# Patient Record
(demographics unavailable — no encounter records)

---

## 2024-11-06 NOTE — HISTORY OF PRESENT ILLNESS
[de-identified] : rash [FreeTextEntry6] : Just moved here from [place] 1 month ago. Generally healthy.    He needs to get vaccines. He needs flu an varicella.   He has a rash on his arm and on his stomach.  He has a rash on his arms   Photos bumpy on abdomen skin.    no rash on legs.   Tiny patch on right arm from [place].  Bathing every evening. Aveeno natural baby soap.  Use aveeno cream.

## 2024-11-12 NOTE — HISTORY OF PRESENT ILLNESS
[Mother] : mother [Normal] : Normal [Smoke Detectors] : Smoke detectors [Carbon Monoxide Detectors] : Carbon monoxide detectors [de-identified] : he has a cup of milk per day and weaning off of formula, good eater most of the time.  loves breakfast. [FreeTextEntry3] : thru the night more or less. just weaned off breast at 1st birthday, transitioning to one nap. [de-identified] : they are in temporary housing until Dec 1 [de-identified] : [place] [FreeTextEntry1] : The atopic dermatitis is much improved with the steroid cream.  nighttime formula in bottle  they brush his teeth  He eats Wheatabix daily and used to drink JUMANA formula, now he has one JUMANA  bottle per night. Mom had anemia during pregnancy but no longer.

## 2024-11-12 NOTE — PLAN
[TextEntry] :  5 ml of Paracetamol every 4 hours for fever > 100.4 or crankiness for the next 72 hours and again in 7-10 days.  Transition to whole cow's milk. Continue table foods, 3 meals with 2-3 snacks per day. Incorporate up to 6 oz of fluorinated water daily in a sippy cup. Brush teeth twice a day with soft toothbrush. Recommend visit to dentist. When in car, keep child in rear-facing car seats until age 2, or until  the maximum height and weight for seat is reached. Put baby to sleep in own crib with no loose or soft bedding. Lower crib matress. Help baby to maintain consistent daily routines and sleep schedule. Recognize stranger and separation anxiety. Ensure home is safe since baby is increasingly mobile. Be within arm's reach of baby at all times. Use consistent, positive discipline. Avoid screen time. Read aloud to baby.  lead and cbc sent to lab.,  lead was 4.4 and Hgb 9.9

## 2024-11-12 NOTE — PHYSICAL EXAM
[Alert] : alert [Normocephalic] : normocephalic [Red Reflex] : red reflex bilateral [PERRL] : PERRL [Normally Placed Ears] : normally placed ears [Auricles Well Formed] : auricles well formed [Clear Tympanic membranes] : clear tympanic membranes [Light reflex present] : light reflex present [Bony landmarks visible] : bony landmarks visible [Nares Patent] : nares patent [Palate Intact] : palate intact [Uvula Midline] : uvula midline [Tooth Eruption] : tooth eruption [Supple, full passive range of motion] : supple, full passive range of motion [Symmetric Chest Rise] : symmetric chest rise [Clear to Auscultation Bilaterally] : clear to auscultation bilaterally [Regular Rate and Rhythm] : regular rate and rhythm [S1, S2 present] : S1, S2 present [+2 Femoral Pulses] : (+) 2 femoral pulses [Soft] : soft [Bowel Sounds] : normoactive bowel sounds [Normal External Genitalia] : normal external genitalia [Central Urethral Opening] : central urethral opening [Testicles Descended] : testicles descended bilaterally [No Abnormal Lymph Nodes Palpated] : no abnormal lymph nodes palpated [Symmetric Abduction and Rotation of Hips] : symmetric abduction and rotation of hips [Straight] : straight [Cranial Nerves Grossly Intact] : cranial nerves grossly intact [Closed Anterior [place]] : open anterior fontanelle [Discharge] : no discharge [Palpable Masses] : no palpable masses [Murmurs] : no murmurs [Tender] : nontender [Distended] : nondistended [Hepatomegaly] : no hepatomegaly [Splenomegaly] : no splenomegaly [Circumcised] : not circumcised [Allis Sign] : negative Allis sign [Rash or Lesions] : no rash/lesions